# Patient Record
Sex: FEMALE | Race: WHITE
[De-identification: names, ages, dates, MRNs, and addresses within clinical notes are randomized per-mention and may not be internally consistent; named-entity substitution may affect disease eponyms.]

---

## 2017-12-13 ENCOUNTER — HOSPITAL ENCOUNTER (OUTPATIENT)
Dept: HOSPITAL 62 - WI | Age: 57
End: 2017-12-13
Attending: OBSTETRICS & GYNECOLOGY
Payer: COMMERCIAL

## 2017-12-13 DIAGNOSIS — Z12.31: Primary | ICD-10-CM

## 2017-12-13 PROCEDURE — G0202 SCR MAMMO BI INCL CAD: HCPCS

## 2017-12-13 PROCEDURE — 77067 SCR MAMMO BI INCL CAD: CPT

## 2017-12-13 NOTE — WOMENS IMAGING REPORT
EXAM DESCRIPTION:  BILAT SCREENING MAMMO W/CAD



COMPLETED DATE/TIME:  12/13/2017 7:42 am



REASON FOR STUDY:  SCREENING MAMMO Z12.31  ENCNTR SCREEN MAMMOGRAM FOR MALIGNANT NEOPLASM OF [place]



COMPARISON:  2009 to 2016



TECHNIQUE:  Standard craniocaudal and mediolateral oblique views of each breast recorded using digita
l acquisition.



LIMITATIONS:  None.



FINDINGS:  No masses, calcifications or architectural distortion. No areas of suspicion.

Read with the assistance of CAD.

.[facility] - R2 Cenova Version 1.3

.[facility] - R2 Cenova Version 1.3

.\[facility]\"" [facility] - R2 Cenova Version 2.4

.[facility] - R2 Cenova Version 2.4

.[facility] - R2  Version 9.2



IMPRESSION:  NORMAL MAMMOGRAM.  BIRADS 1.



BREAST DENSITY:  b. There are scattered areas of fibroglandular density.



BIRAD:  1 NEGATIVE



RECOMMENDATION:  ROUTINE SCREENING



COMMENT:  The patient has been notified of the results by letter per MQSA requirements. Additional no
tification policies are in place for contacting patient with suspicious or incomplete findings.

Quality ID #225: The American College of Radiology recommends an annual screening mammogram for women
 aged 40 years or over. This facility utilizes a reminder system to ensure that all patients receive 
reminder letters, and/or direct phone calls for appointments. This includes reminders for routine scr
eening mammograms, diagnostic mammograms, or other Breast Imaging Interventions when appropriate.  Th
is patient will be placed in the appropriate reminder system.

The American College of Radiology (ACR) has developed recommendations for screening MRI of the breast
s in certain patient populations, to be used in conjunction with mammography.  Breast MRI surveillanc
e may be appropriate for women with more than 20% lifetime risk of developing breast cancer  as deter
mined by genetic testing, significant family history of the disease, or history of mantle radiation f
or Hodgkins Disease.  ACR Practice Guidelines 2008.



TECHNICAL DOCUMENTATION:  FINDING NUMBER: (1)

ASSESSMENT: (1)

JOB ID:  [id number]

 2011 [employer]- All Rights Reserved

## 2018-01-23 ENCOUNTER — HOSPITAL ENCOUNTER (OUTPATIENT)
Dept: HOSPITAL 62 - WI | Age: 58
End: 2018-01-23
Attending: INTERNAL MEDICINE
Payer: COMMERCIAL

## 2018-01-23 DIAGNOSIS — R10.13: Primary | ICD-10-CM

## 2018-01-23 PROCEDURE — 76705 ECHO EXAM OF ABDOMEN: CPT

## 2018-01-23 NOTE — WOMENS IMAGING REPORT
EXAM DESCRIPTION:  U/S ABDOMEN LIMITED



COMPLETED DATE/TIME:  1/23/2018 8:34 am



REASON FOR STUDY:  EPIGASTRIC PAIN R10.13  EPIGASTRIC PAIN



COMPARISON:  None.



TECHNIQUE:  Dynamic and static grayscale images acquired of the abdomen and recorded on PACS. Additio
nal selected color Doppler and spectral images recorded.



LIMITATIONS:  None.



FINDINGS:  PANCREAS: Midline pancreas unremarkable

LIVER: No masses. Echotexture normal.

LIVER VASCULATURE: Normal directional flow of the main portal vein and hepatic veins.

GALLBLADDER: Surgically absent

ULTRASOUND-DETECTED CURRY'S SIGN: Negative.

INTRAHEPATIC DUCTS AND COMMON DUCT: CBD and intrahepatic ducts normal caliber. No filling defects.  D
istal most common duct not well seen due to duodenum gas

INFERIOR VENA CAVA: Normal flow.

AORTA: Not well seen

RIGHT KIDNEY:  Normal size. Normal echogenicity. No solid or suspicious masses. No hydronephrosis. No
 calcifications.

PERITONEAL AND RIGHT PLEURAL SPACE: No ascites or effusions.

OTHER: No other significant findings.



IMPRESSION:  Post cholecystectomy.  Otherwise unremarkable study



TECHNICAL DOCUMENTATION:  JOB ID:  [id number]

 2011 [employer]- All Rights Reserved

## 2018-10-19 ENCOUNTER — HOSPITAL ENCOUNTER (OUTPATIENT)
Dept: HOSPITAL 62 - OD | Age: 58
End: 2018-10-19
Attending: INTERNAL MEDICINE
Payer: COMMERCIAL

## 2018-10-19 DIAGNOSIS — Z00.00: Primary | ICD-10-CM

## 2018-10-19 DIAGNOSIS — I10: ICD-10-CM

## 2018-10-19 DIAGNOSIS — E78.5: ICD-10-CM

## 2018-10-19 DIAGNOSIS — E55.9: ICD-10-CM

## 2018-10-19 DIAGNOSIS — R53.83: ICD-10-CM

## 2018-10-19 LAB
ADD MANUAL DIFF: NO
ALBUMIN SERPL-MCNC: 3.9 G/DL (ref 3.5–5)
ALP SERPL-CCNC: 103 U/L (ref 38–126)
ALT SERPL-CCNC: 30 U/L (ref 9–52)
ANION GAP SERPL CALC-SCNC: 8 MMOL/L (ref 5–19)
AST SERPL-CCNC: 23 U/L (ref 14–36)
BASOPHILS # BLD AUTO: 0 10^3/UL (ref 0–0.2)
BASOPHILS NFR BLD AUTO: 0.4 % (ref 0–2)
BILIRUB DIRECT SERPL-MCNC: 0.2 MG/DL (ref 0–0.4)
BILIRUB SERPL-MCNC: 0.6 MG/DL (ref 0.2–1.3)
BUN SERPL-MCNC: 18 MG/DL (ref 7–20)
CALCIUM: 9.5 MG/DL (ref 8.4–10.2)
CHLORIDE SERPL-SCNC: 105 MMOL/L (ref 98–107)
CHOLEST SERPL-MCNC: 166 MG/DL (ref 0–200)
CO2 SERPL-SCNC: 30 MMOL/L (ref 22–30)
EOSINOPHIL # BLD AUTO: 0.1 10^3/UL (ref 0–0.6)
EOSINOPHIL NFR BLD AUTO: 2.4 % (ref 0–6)
ERYTHROCYTE [DISTWIDTH] IN BLOOD BY AUTOMATED COUNT: 13.4 % (ref 11.5–14)
GLUCOSE SERPL-MCNC: 88 MG/DL (ref 75–110)
HCT VFR BLD CALC: 40.6 % (ref 36–47)
HGB BLD-MCNC: 13.6 G/DL (ref 12–15.5)
LDLC SERPL DIRECT ASSAY-MCNC: 77 MG/DL (ref ?–100)
LYMPHOCYTES # BLD AUTO: 1.5 10^3/UL (ref 0.5–4.7)
LYMPHOCYTES NFR BLD AUTO: 28.2 % (ref 13–45)
MCH RBC QN AUTO: 30.5 PG (ref 27–33.4)
MCHC RBC AUTO-ENTMCNC: 33.6 G/DL (ref 32–36)
MCV RBC AUTO: 91 FL (ref 80–97)
MONOCYTES # BLD AUTO: 0.3 10^3/UL (ref 0.1–1.4)
MONOCYTES NFR BLD AUTO: 5.7 % (ref 3–13)
NEUTROPHILS # BLD AUTO: 3.4 10^3/UL (ref 1.7–8.2)
NEUTS SEG NFR BLD AUTO: 63.3 % (ref 42–78)
PLATELET # BLD: 201 10^3/UL (ref 150–450)
POTASSIUM SERPL-SCNC: 4.7 MMOL/L (ref 3.6–5)
PROT SERPL-MCNC: 7 G/DL (ref 6.3–8.2)
RBC # BLD AUTO: 4.48 10^6/UL (ref 3.72–5.28)
SODIUM SERPL-SCNC: 142.6 MMOL/L (ref 137–145)
TOTAL CELLS COUNTED % (AUTO): 100 %
TRIGL SERPL-MCNC: 70 MG/DL (ref ?–150)
VLDLC SERPL CALC-MCNC: 14 MG/DL (ref 10–31)
WBC # BLD AUTO: 5.4 10^3/UL (ref 4–10.5)

## 2018-10-19 PROCEDURE — 84443 ASSAY THYROID STIM HORMONE: CPT

## 2018-10-19 PROCEDURE — 82306 VITAMIN D 25 HYDROXY: CPT

## 2018-10-19 PROCEDURE — 85025 COMPLETE CBC W/AUTO DIFF WBC: CPT

## 2018-10-19 PROCEDURE — 80061 LIPID PANEL: CPT

## 2018-10-19 PROCEDURE — 36415 COLL VENOUS BLD VENIPUNCTURE: CPT

## 2018-10-19 PROCEDURE — 80053 COMPREHEN METABOLIC PANEL: CPT

## 2018-12-14 ENCOUNTER — HOSPITAL ENCOUNTER (OUTPATIENT)
Dept: HOSPITAL 62 - WI | Age: 58
End: 2018-12-14
Attending: OBSTETRICS & GYNECOLOGY
Payer: COMMERCIAL

## 2018-12-14 DIAGNOSIS — Z12.31: Primary | ICD-10-CM

## 2018-12-14 PROCEDURE — 77063 BREAST TOMOSYNTHESIS BI: CPT

## 2018-12-14 PROCEDURE — 77067 SCR MAMMO BI INCL CAD: CPT

## 2018-12-14 NOTE — WOMENS IMAGING REPORT
EXAM DESCRIPTION:  3D SCREENING MAMMO BILAT



COMPLETED DATE/TIME:  12/14/2018 10:17 am



REASON FOR STUDY:  Z12.31 SCREENING MAMMO Z12.31  ENCNTR SCREEN MAMMOGRAM FOR MALIGNANT NEOPLASM OF B
RE



COMPARISON:  Multiple since 2009



TECHNIQUE:  Standard craniocaudal and mediolateral oblique views of each breast recorded using digita
l acquisition and breast tomosynthesis.



LIMITATIONS:  None.



FINDINGS:  No masses, calcifications or architectural distortion. No areas of suspicion.

Read with the assistance of CAD.

.[facility] - R2 Cenova Version 1.3

.[facility] - R2 Cenova Version 1.3

.[facility] - R2 Cenova Version 2.4

.[facility] - R2 Cenova Version 2.4

.[facility] - R2  Version 9.2



IMPRESSION:  NORMAL MAMMOGRAM.  BIRADS 1.



BREAST DENSITY:  c. The breasts are heterogeneously dense, which may obscure small masses.



BIRAD:  1 NEGATIVE



RECOMMENDATION:  ROUTINE SCREENING

Please continue yearly bilateral screening mammography/tomosynthesis in December 2019



COMMENT:  The patient has been notified of the results by letter per SA requirements. Additional no
tification policies are in place for contacting patient with suspicious or incomplete findings.

Quality ID #225: The American College of Radiology recommends an annual screening mammogram for women
 aged 40 years or over. This facility utilizes a reminder system to ensure that all patients receive 
reminder letters, and/or direct phone calls for appointments. This includes reminders for routine scr
eening mammograms, diagnostic mammograms, or other Breast Imaging Interventions when appropriate.  Th
is patient will be placed in the appropriate reminder system.

The American College of Radiology (ACR) has developed recommendations for screening MRI of the breast
s in certain patient populations, to be used in conjunction with mammography.  Breast MRI surveillanc
e may be appropriate for women with more than 20% lifetime risk of developing breast cancer  as deter
mined by genetic testing, significant family history of the disease, or history of mantle radiation f
or Hodgkins Disease.  ACR Practice Guidelines 2008.

[employer]



PQRS 6045F: Fluoroscopic imaging is not utilized for breast tomosynthesis.



TECHNICAL DOCUMENTATION:  FINDING NUMBER: (1)

ASSESSMENT:  (1)

JOB ID:  [id number]

 2011 [employer]- All Rights Reserved



Reading location - IP/workstation name: [facility]

## 2019-09-16 ENCOUNTER — HOSPITAL ENCOUNTER (EMERGENCY)
Dept: HOSPITAL 62 - ER | Age: 59
Discharge: HOME | End: 2019-09-16
Payer: COMMERCIAL

## 2019-09-16 VITALS — SYSTOLIC BLOOD PRESSURE: 150 MMHG | DIASTOLIC BLOOD PRESSURE: 81 MMHG

## 2019-09-16 DIAGNOSIS — R11.0: ICD-10-CM

## 2019-09-16 DIAGNOSIS — N30.91: Primary | ICD-10-CM

## 2019-09-16 DIAGNOSIS — I10: ICD-10-CM

## 2019-09-16 DIAGNOSIS — Z98.1: ICD-10-CM

## 2019-09-16 DIAGNOSIS — R35.0: ICD-10-CM

## 2019-09-16 DIAGNOSIS — Z88.2: ICD-10-CM

## 2019-09-16 DIAGNOSIS — Z90.49: ICD-10-CM

## 2019-09-16 LAB
ADD MANUAL DIFF: NO
ALBUMIN SERPL-MCNC: 4.5 G/DL (ref 3.5–5)
ALP SERPL-CCNC: 129 U/L (ref 38–126)
ANION GAP SERPL CALC-SCNC: 9 MMOL/L (ref 5–19)
APPEARANCE UR: (no result)
APTT PPP: (no result) S
AST SERPL-CCNC: 27 U/L (ref 14–36)
BASOPHILS # BLD AUTO: 0 10^3/UL (ref 0–0.2)
BASOPHILS NFR BLD AUTO: 0.4 % (ref 0–2)
BILIRUB DIRECT SERPL-MCNC: 0 MG/DL (ref 0–0.4)
BILIRUB SERPL-MCNC: 0.5 MG/DL (ref 0.2–1.3)
BILIRUB UR QL STRIP: NEGATIVE
BUN SERPL-MCNC: 13 MG/DL (ref 7–20)
CALCIUM: 10 MG/DL (ref 8.4–10.2)
CHLORIDE SERPL-SCNC: 99 MMOL/L (ref 98–107)
CO2 SERPL-SCNC: 30 MMOL/L (ref 22–30)
EOSINOPHIL # BLD AUTO: 0 10^3/UL (ref 0–0.6)
EOSINOPHIL NFR BLD AUTO: 0.2 % (ref 0–6)
ERYTHROCYTE [DISTWIDTH] IN BLOOD BY AUTOMATED COUNT: 13.2 % (ref 11.5–14)
GLUCOSE SERPL-MCNC: 108 MG/DL (ref 75–110)
GLUCOSE UR STRIP-MCNC: NEGATIVE MG/DL
HCT VFR BLD CALC: 40.9 % (ref 36–47)
HGB BLD-MCNC: 13.8 G/DL (ref 12–15.5)
KETONES UR STRIP-MCNC: NEGATIVE MG/DL
LYMPHOCYTES # BLD AUTO: 1 10^3/UL (ref 0.5–4.7)
LYMPHOCYTES NFR BLD AUTO: 8.9 % (ref 13–45)
MCH RBC QN AUTO: 29.8 PG (ref 27–33.4)
MCHC RBC AUTO-ENTMCNC: 33.8 G/DL (ref 32–36)
MCV RBC AUTO: 88 FL (ref 80–97)
MONOCYTES # BLD AUTO: 0.4 10^3/UL (ref 0.1–1.4)
MONOCYTES NFR BLD AUTO: 3.6 % (ref 3–13)
NEUTROPHILS # BLD AUTO: 10.1 10^3/UL (ref 1.7–8.2)
NEUTS SEG NFR BLD AUTO: 86.9 % (ref 42–78)
NITRITE UR QL STRIP: POSITIVE
PH UR STRIP: 7 [PH] (ref 5–9)
PLATELET # BLD: 182 10^3/UL (ref 150–450)
POTASSIUM SERPL-SCNC: 4 MMOL/L (ref 3.6–5)
PROT SERPL-MCNC: 7.6 G/DL (ref 6.3–8.2)
PROT UR STRIP-MCNC: 100 MG/DL
RBC # BLD AUTO: 4.64 10^6/UL (ref 3.72–5.28)
SP GR UR STRIP: 1.01
TOTAL CELLS COUNTED % (AUTO): 100 %
UROBILINOGEN UR-MCNC: 8 MG/DL (ref ?–2)
WBC # BLD AUTO: 11.6 10^3/UL (ref 4–10.5)

## 2019-09-16 PROCEDURE — S0119 ONDANSETRON 4 MG: HCPCS

## 2019-09-16 PROCEDURE — 81001 URINALYSIS AUTO W/SCOPE: CPT

## 2019-09-16 PROCEDURE — 85025 COMPLETE CBC W/AUTO DIFF WBC: CPT

## 2019-09-16 PROCEDURE — 80053 COMPREHEN METABOLIC PANEL: CPT

## 2019-09-16 PROCEDURE — 36415 COLL VENOUS BLD VENIPUNCTURE: CPT

## 2019-09-16 PROCEDURE — 99283 EMERGENCY DEPT VISIT LOW MDM: CPT

## 2019-09-16 NOTE — ER DOCUMENT REPORT
ED Medical Screen (RME)





- General


Chief Complaint: Urinary Problem


Stated Complaint: NAUSEA/BLOOD IN URINE


Time Seen by Provider: 09/16/19 19:51


Primary Care Provider: 


EVANS BANKS MD [Primary Care Provider] - Follow up as needed


Mode of Arrival: Ambulatory


Information source: Patient


Notes: 





Patient is a 58-year-old female presenting to the emergency department chief 

complaint of hematuria, dysuria and generalized malaise.  Patient reports 3 days

ago she had a syncopal episode at home.  She denies getting any treatment for 

that.  She currently does not denies any fever, chest pain, shortness of breath,

abdominal pain or back pain.





Exam:


Abdomen soft, nontender without guarding or rebound.


Lung sounds clear and equal bilaterally.


Patient alert, oriented, answering all questions appropriately.





I have greeted and performed a rapid initial assessment of this patient.  A 

comprehensive ED assessment and evaluation of the patient, analysis of test 

results and completion of the medical decision making process will be conducted 

by additional ED providers.  I have specifically instructed the patient or 

family members with the patient to immediately return to any nursing staff 

should anything change in the patient's condition or with their chief complaint.





This medical record was dictated with voice recognizing software.  There may be 

grammatical, syntax errors that are unintended.





TRAVEL OUTSIDE OF THE [place] IN LAST 30 DAYS: No





- Related Data


Allergies/Adverse Reactions: 


                                        





Sulfa (Sulfonamide Antibiotics) Allergy (Verified 09/16/19 18:57)


   


diazide Adverse Reaction (Intermediate, Uncoded 09/16/19 19:49)


   











Past Medical History





- Past Medical History


Cardiac Medical History: Reports: Hx Hypertension


   Denies: Hx Atrial Fibrillation, Hx Congestive Heart Failure, Hx Coronary 

Artery Disease, Hx Heart Attack, Hx Hypercholesterolemia, Hx Peripheral Vascular

Disease, Hx Heart Murmur


Pulmonary Medical History: Reports: Hx Pneumonia


   Denies: Hx Tuberculosis


GI Medical History: Reports: Hx Gastroesophageal Reflux Disease


Musculoskeltal Medical History: Reports Hx Arthritis, Reports Hx Musculoskeletal

Trauma


Traumatic Medical History: Reports: Hx Fractures


Past Surgical History: Reports: Hx Bowel Surgery - as infant for bleeding in the

bowels, Hx Cholecystectomy, Hx Orthopedic Surgery - cervical fusion x 2, 

bilateral knees torn meniscus.  Denies: Hx Pacemaker





- Immunizations


Hx Diphtheria, Pertussis, Tetanus Vaccination: Yes - 11/16





Physical Exam





- Vital signs


Vitals: 





                                        











Temp Pulse Resp BP Pulse Ox


 


 99.0 F   85   14   160/96 H  98 


 


 09/16/19 19:25  09/16/19 19:25  09/16/19 19:25  09/16/19 19:25  09/16/19 19:25














Course





- Vital Signs


Vital signs: 





                                        











Temp Pulse Resp BP Pulse Ox


 


 99.0 F   85   14   160/96 H  98 


 


 09/16/19 19:25  09/16/19 19:25  09/16/19 19:25  09/16/19 19:25  09/16/19 19:25














Doctor's Discharge





- Discharge


Referrals: 


EVANS BANKS MD [Primary Care Provider] - Follow up as needed

## 2019-09-16 NOTE — ER DOCUMENT REPORT
ED General





- General


Chief Complaint: Urinary Problem


Stated Complaint: NAUSEA/BLOOD IN URINE


Time Seen by Provider: 09/16/19 19:51


Primary Care Provider: 


EVANS BANKS MD [ACTIVE STAFF] - Follow up as needed


Mode of Arrival: Ambulatory


Information source: Patient


TRAVEL OUTSIDE OF THE [place] IN LAST 30 DAYS: No





- HPI


Notes: 





Patient complains of nausea and frequency and dysuria.  She also states she has 

had some blood in her urine.  She states this is been going on for 1 day.  It is

intermittent.  The pain is worse with urination and better without urination.  

The pain radiates across her lower abdomen.  It is crampy and burning.  She is 

also had weakness and malaise.  The pain is been moderate in intensity.  No 

rashes.  No fevers.





- Related Data


Allergies/Adverse Reactions: 


                                        





Sulfa (Sulfonamide Antibiotics) Allergy (Verified 09/16/19 18:57)


   


diazide Adverse Reaction (Intermediate, Uncoded 09/16/19 19:49)


   











Past Medical History





- General


Information source: Patient





- Social History


Smoking Status: Never Smoker


Frequency of alcohol use: None


Drug Abuse: None


Family History: Arthritis, Hypertension, Malignancy


Patient has suicidal ideation: No


Patient has homicidal ideation: No





- Past Medical History


Cardiac Medical History: Reports: Hx Hypertension


   Denies: Hx Atrial Fibrillation, Hx Congestive Heart Failure, Hx Coronary 

Artery Disease, Hx Heart Attack, Hx Hypercholesterolemia, Hx Peripheral Vascular

Disease, Hx Heart Murmur


Pulmonary Medical History: Reports: Hx Pneumonia


   Denies: Hx Tuberculosis


GI Medical History: Reports: Hx Gastroesophageal Reflux Disease


Musculoskeletal Medical History: Reports Hx Arthritis, Reports Hx 

Musculoskeletal Trauma


Traumatic Medical History: Reports: Hx Fractures


Past Surgical History: Reports: Hx Bowel Surgery - as infant for bleeding in the

bowels, Hx Cholecystectomy, Hx Orthopedic Surgery - cervical fusion x 2, 

bilateral knees torn meniscus.  Denies: Hx Pacemaker





- Immunizations


Hx Diphtheria, Pertussis, Tetanus Vaccination: Yes - 11/16





Review of Systems





- Review of Systems


Constitutional: Chills, Malaise, Weakness


Cardiovascular: denies: Chest pain, Dyspnea


Respiratory: denies: Cough, Short of breath


Gastrointestinal: Nausea.  denies: Diarrhea, Vomiting


-: Yes All other systems reviewed and negative





Physical Exam





- Vital signs


Vitals: 





                                        











Temp Pulse Resp BP Pulse Ox


 


 99.0 F   85   14   160/96 H  98 


 


 09/16/19 19:25  09/16/19 19:25  09/16/19 19:25  09/16/19 19:25  09/16/19 19:25











Interpretation: Hypertensive





- General


General appearance: Appears well, Alert





- HEENT


Head: Normocephalic, Atraumatic


Eyes: Normal


Pupils: PERRL





- Respiratory


Respiratory status: No respiratory distress


Chest status: Nontender


Breath sounds: Normal


Chest palpation: Normal





- Cardiovascular


Rhythm: Regular


Heart sounds: Normal auscultation


Murmur: No





- Abdominal


Inspection: Normal


Distension: No distension


Bowel sounds: Normal


Tenderness: Tender - Mild lower abdominal tenderness to palpation


Organomegaly: No organomegaly





- Back


Back: Normal, Nontender





- Extremities


General upper extremity: Normal inspection, Nontender, Normal color, Normal ROM,

Normal temperature


General lower extremity: Normal inspection, Nontender, Normal color, Normal ROM,

Normal temperature, Normal weight bearing.  No: Shea's sign





- Neurological


Neuro grossly intact: Yes


Cognition: Normal


Orientation: AAOx4


Kia Coma Scale Eye Opening: Spontaneous


Kulm Coma Scale Verbal: Oriented


Kulm Coma Scale Motor: Obeys Commands


Kia Coma Scale Total: 15


Speech: Normal


Motor strength normal: LUE, RUE, LLE, RLE


Sensory: Normal





- Psychological


Associated symptoms: Normal affect, Normal mood





- Skin


Skin Temperature: Warm


Skin Moisture: Dry


Skin Color: Normal





Course





- Re-evaluation


Re-evalutation: 





09/16/19 21:40


Patient is clinical presentation is consistent with pyelonephritis.  She will be

started on oral antibiotics as patient has stable vital signs here.  She does 

not appear toxic.  I do not see evidence the patient is going to require 

intravenous fluids or antibiotics.





- Vital Signs


Vital signs: 





                                        











Temp Pulse Resp BP Pulse Ox


 


 99.0 F   85   14   160/96 H  98 


 


 09/16/19 19:25  09/16/19 19:25  09/16/19 19:25  09/16/19 19:25  09/16/19 19:25














- Laboratory


Result Diagrams: 


                                 09/16/19 20:40





                                 09/16/19 20:40


Laboratory results interpreted by me: 





                                        











  09/16/19 09/16/19 09/16/19





  20:40 20:40 20:40


 


WBC  11.6 H  


 


Lymph % (Auto)  8.9 L  


 


Absolute Neuts (auto)  10.1 H  


 


Seg Neutrophils %  86.9 H  


 


Alkaline Phosphatase   129 H 


 


Urine Protein    100 H


 


Urine Blood    LARGE H


 


Urine Nitrite    POSITIVE H


 


Urine Urobilinogen    8.0 H


 


Ur Leukocyte Esterase    LARGE H














Discharge





- Discharge


Clinical Impression: 


 Hemorrhagic cystitis





Condition: Stable


Disposition: HOME, SELF-CARE


Instructions:  Urinary Tract Infection ([facility])


Additional Instructions: 


Please call your primary care physician first thing in the morning to arrange 

follow-up.


Prescriptions: 


Cefdinir 300 mg PO BID 10 Days #20 capsule


Ondansetron [Zofran Odt 4 mg Tablet] 1 - 2 tab PO Q4H PRN #15 tab.rapdis


 PRN Reason: For Nausea/Vomiting


Referrals: 


EVANS BANKS MD [ACTIVE STAFF] - Follow up in 3-5 days

## 2019-10-15 ENCOUNTER — HOSPITAL ENCOUNTER (OUTPATIENT)
Dept: HOSPITAL 62 - SP | Age: 59
End: 2019-10-15
Attending: FAMILY MEDICINE
Payer: COMMERCIAL

## 2019-10-15 DIAGNOSIS — R01.1: Primary | ICD-10-CM

## 2019-10-15 PROCEDURE — 93306 TTE W/DOPPLER COMPLETE: CPT

## 2019-10-15 NOTE — XCELERA REPORT
[address] [address]

                               Tel: [phone]

                               Fax: [phone]



                      Transthoracic Echocardiogram Report

_______________________________________________________________________________



Name: ESTEFANI SARMIENTO

MRN: [id number]                                Age: 59 yrs

Gender: Female                                 : 1960

Patient Status: Preadmit                       Patient Location: 

Account #: [id number]

Study Date: 10/15/2019 09:57 AM

Accession #: [id number]

_______________________________________________________________________________



Height: 64 in        Weight: 185 lb        BSA: 1.9 m2

_______________________________________________________________________________

Procedure: A complete two-dimensional transthoracic echocardiogram was

performed (2D, M-mode, spectral and color flow Doppler). The study was

technically adequate with some images being suboptimal in quality.

Reason For Study: CARDIAC MURMUR





Ordering Physician: MELVIN JUSTICE

Performed By: Rebeca Ortiz



_______________________________________________________________________________



Interpretation Summary

The left ventricular ejection fraction is normal.

Doppler measurements suggest pseudonormalized left ventricular relaxation,

which is associated with grade II/IV or mild to moderate diastolic dysfunction

There is borderline concentric left ventricular hypertrophy.

The left ventricle is grossly normal size.

Wall motion cannot be accurately commented on, but no definite regional wall

motion abnormalities noted.

The right ventricular systolic function is normal.

The left atrium is mildly dilated.

The right atrium is normal in size

There is a trace amount of mitral regurgitation

There is no mitral valve stenosis.

No aortic regurgitation is present.

There is no aortic valve stenosis

There is a trace to mild amount of tricuspid regurgitation

Right ventricular systolic pressure is at the upper limits of normal

The aortic root is not well visualized but is probably normal size.

The inferior vena cava was not well visualized

There is no pericardial effusion.



MMode/2D Measurements & Calculations

RVDd: 3.2 cm   LVIDd: 4.8 cm   FS: 42.2 %             Ao root diam: 2.9 cm

IVSd: 0.74 cm  LVIDs: 2.8 cm   EDV(Teich): 108.8 ml

                                                      Ao root area: 6.7 cm2

               LVPWd: 0.95 cm  ESV(Teich): 29.3 ml

                               EF(Teich): 73.1 %



Doppler Measurements & Calculations

MV E max bakari:       MV dec slope:         Ao V2 max:         LV V1 max P.8 cm/sec         492.8 cm/sec2         127.9 cm/sec       3.7 mmHg

MV A max bakari:       MV dec time: 0.12 sec Ao max P.5 mmHgLV V1 max:

108.1 cm/sec                                                 95.7 cm/sec

MV E/A: 0.55

        _______________________________________________________________

PA V2 max:          PI end-d bakari:         TR max bakari:

109.8 cm/sec        132.7 cm/sec          252.0 cm/sec

PA max P.8 mmHg                       TR max P.4 mmHg





Left Ventricle

The left ventricle is grossly normal size. There is borderline concentric left

ventricular hypertrophy. The left ventricular ejection fraction is normal.

Doppler measurements suggest pseudonormalized left ventricular relaxation,

which is associated with grade II/IV or mild to moderate diastolic

dysfunction. Wall motion cannot be accurately commented on, but no definite

regional wall motion abnormalities noted.



Right Ventricle

The right ventricle is grossly normal size. There is normal right ventricular

wall thickness. The right ventricular systolic function is normal.



Atria

The right atrium is normal in size. The left atrium is mildly dilated.

Interarterial septum not well visualized and not well dopplered. Cannot

comment on ASD/PFO presence.



Mitral Valve

The mitral valve is grossly normal. There is no mitral valve stenosis. There

is a trace amount of mitral regurgitation.





Aortic Valve

The aortic valve is grossly normal. There is no aortic valve stenosis. No

aortic regurgitation is present.



Tricuspid Valve

The tricuspid valve is not well visualized, but is grossly normal. There is no

tricuspid stenosis. There is a trace to mild amount of tricuspid

regurgitation. Right ventricular systolic pressure is at the upper limits of

normal.



Pulmonic Valve

The pulmonic valve is not well visualized.



Great Vessels

The aortic root is not well visualized but is probably normal size. The

inferior vena cava was not well visualized.





Effusions

There is no pericardial effusion.



_______________________________________________________________________________

_______________________________________________________________________________



Electronically signed by:      Yolis Dee      on 10/15/2019 06:50 PM



CC: MELVIN JUSTICE Shyamal

## 2020-09-16 ENCOUNTER — HOSPITAL ENCOUNTER (EMERGENCY)
Dept: HOSPITAL 62 - ER | Age: 60
LOS: 1 days | Discharge: HOME | End: 2020-09-17
Payer: COMMERCIAL

## 2020-09-16 DIAGNOSIS — I10: ICD-10-CM

## 2020-09-16 DIAGNOSIS — M79.89: ICD-10-CM

## 2020-09-16 DIAGNOSIS — Z88.2: ICD-10-CM

## 2020-09-16 DIAGNOSIS — R11.0: ICD-10-CM

## 2020-09-16 DIAGNOSIS — Z79.899: ICD-10-CM

## 2020-09-16 DIAGNOSIS — R00.2: Primary | ICD-10-CM

## 2020-09-16 DIAGNOSIS — E87.6: ICD-10-CM

## 2020-09-16 LAB
ADD MANUAL DIFF: NO
ALBUMIN SERPL-MCNC: 4.2 G/DL (ref 3.5–5)
ALP SERPL-CCNC: 125 U/L (ref 38–126)
ANION GAP SERPL CALC-SCNC: 8 MMOL/L (ref 5–19)
APTT BLD: 26.4 SEC (ref 23.5–35.8)
AST SERPL-CCNC: 22 U/L (ref 14–36)
BASOPHILS # BLD AUTO: 0 10^3/UL (ref 0–0.2)
BASOPHILS NFR BLD AUTO: 0.2 % (ref 0–2)
BILIRUB DIRECT SERPL-MCNC: 0.2 MG/DL (ref 0–0.4)
BILIRUB SERPL-MCNC: 0.6 MG/DL (ref 0.2–1.3)
BUN SERPL-MCNC: 12 MG/DL (ref 7–20)
CALCIUM: 9.5 MG/DL (ref 8.4–10.2)
CHLORIDE SERPL-SCNC: 97 MMOL/L (ref 98–107)
CK SERPL-CCNC: 46 U/L (ref 30–135)
CO2 SERPL-SCNC: 32 MMOL/L (ref 22–30)
EOSINOPHIL # BLD AUTO: 0.1 10^3/UL (ref 0–0.6)
EOSINOPHIL NFR BLD AUTO: 0.6 % (ref 0–6)
ERYTHROCYTE [DISTWIDTH] IN BLOOD BY AUTOMATED COUNT: 12.9 % (ref 11.5–14)
GLUCOSE SERPL-MCNC: 113 MG/DL (ref 75–110)
HCT VFR BLD CALC: 41.3 % (ref 36–47)
HGB BLD-MCNC: 14 G/DL (ref 12–15.5)
INR PPP: 0.95
LYMPHOCYTES # BLD AUTO: 1.5 10^3/UL (ref 0.5–4.7)
LYMPHOCYTES NFR BLD AUTO: 15.6 % (ref 13–45)
MCH RBC QN AUTO: 30.9 PG (ref 27–33.4)
MCHC RBC AUTO-ENTMCNC: 34 G/DL (ref 32–36)
MCV RBC AUTO: 91 FL (ref 80–97)
MONOCYTES # BLD AUTO: 0.5 10^3/UL (ref 0.1–1.4)
MONOCYTES NFR BLD AUTO: 5.7 % (ref 3–13)
NEUTROPHILS # BLD AUTO: 7.5 10^3/UL (ref 1.7–8.2)
NEUTS SEG NFR BLD AUTO: 77.9 % (ref 42–78)
PLATELET # BLD: 180 10^3/UL (ref 150–450)
POTASSIUM SERPL-SCNC: 3.4 MMOL/L (ref 3.6–5)
PROT SERPL-MCNC: 7.5 G/DL (ref 6.3–8.2)
PROTHROMBIN TIME: 12.9 SEC (ref 11.4–15.4)
RBC # BLD AUTO: 4.55 10^6/UL (ref 3.72–5.28)
TOTAL CELLS COUNTED % (AUTO): 100 %
WBC # BLD AUTO: 9.6 10^3/UL (ref 4–10.5)

## 2020-09-16 PROCEDURE — 85610 PROTHROMBIN TIME: CPT

## 2020-09-16 PROCEDURE — 84443 ASSAY THYROID STIM HORMONE: CPT

## 2020-09-16 PROCEDURE — 99285 EMERGENCY DEPT VISIT HI MDM: CPT

## 2020-09-16 PROCEDURE — 83735 ASSAY OF MAGNESIUM: CPT

## 2020-09-16 PROCEDURE — 36415 COLL VENOUS BLD VENIPUNCTURE: CPT

## 2020-09-16 PROCEDURE — 85730 THROMBOPLASTIN TIME PARTIAL: CPT

## 2020-09-16 PROCEDURE — 85025 COMPLETE CBC W/AUTO DIFF WBC: CPT

## 2020-09-16 PROCEDURE — 82550 ASSAY OF CK (CPK): CPT

## 2020-09-16 PROCEDURE — 93010 ELECTROCARDIOGRAM REPORT: CPT

## 2020-09-16 PROCEDURE — 71046 X-RAY EXAM CHEST 2 VIEWS: CPT

## 2020-09-16 PROCEDURE — 84484 ASSAY OF TROPONIN QUANT: CPT

## 2020-09-16 PROCEDURE — 93005 ELECTROCARDIOGRAM TRACING: CPT

## 2020-09-16 PROCEDURE — 80053 COMPREHEN METABOLIC PANEL: CPT

## 2020-09-16 NOTE — ER DOCUMENT REPORT
ED Medical Screen (RME)





- General


Chief Complaint: Irregular Pulse


Stated Complaint: IRREGULAR HEART BEAT AND NAUSEA


Time Seen by Provider: 09/16/20 20:25


Primary Care Provider: 


EVANS BANKS MD [Primary Care Provider] - Follow up as needed


Mode of Arrival: Ambulatory


Information source: Patient


Notes: 





59-year-old female presented to ED for complaint of feeling like she has 

palpitation nausea and abdominal cramping.  She states she has not had any fever

or any other symptoms.  She does have a history of diastolic dysfunction and 

heart murmurs.  She also has a history of high blood pressure reflux.  She 

states she is not having chest pain but she does feel like her heart is 

pounding.  She states it does make her concerned so she came to get this checked

out.  Patient is alert oriented respirations regular nonlabored speaking in full

sentences at this time. 














I have greeted and performed a rapid initial assessment of this patient.  A 

comprehensive ED assessment and evaluation of the patient, analysis of test 

results and completion of medical decision making process will be conducted by 

an additional ED providers.


TRAVEL OUTSIDE OF THE [place] IN LAST 30 DAYS: No





- Related Data


Allergies/Adverse Reactions: 


                                        





Sulfa (Sulfonamide Antibiotics) Allergy (Verified 09/16/20 20:26)


   


diazide Adverse Reaction (Intermediate, Uncoded 09/16/19 19:49)


   











Past Medical History





- Past Medical History


Cardiac Medical History: Reports: Hx Hypertension


   Denies: Hx Atrial Fibrillation, Hx Congestive Heart Failure, Hx Coronary 

Artery Disease, Hx Heart Attack, Hx Hypercholesterolemia, Hx Peripheral Vascular

Disease, Hx Heart Murmur


Pulmonary Medical History: Reports: Hx Pneumonia


   Denies: Hx Tuberculosis


GI Medical History: Reports: Hx Gastroesophageal Reflux Disease


Musculoskeltal Medical History: Reports Hx Arthritis, Reports Hx Musculoskeletal

Trauma


Traumatic Medical History: Reports: Hx Fractures


Past Surgical History: Reports: Hx Bowel Surgery - as infant for bleeding in the

bowels, Hx Cholecystectomy, Hx Orthopedic Surgery - cervical fusion x 2, 

bilateral knees torn meniscus.  Denies: Hx Pacemaker





- Immunizations


Hx Diphtheria, Pertussis, Tetanus Vaccination: Yes - 11/16





Physical Exam





- Vital signs


Vitals: 





                                        











Temp Pulse Resp BP Pulse Ox


 


 98.8 F   97   20   176/101 H  97 


 


 09/16/20 20:03  09/16/20 20:03  09/16/20 20:03  09/16/20 20:03  09/16/20 20:03














Course





- Vital Signs


Vital signs: 





                                        











Temp Pulse Resp BP Pulse Ox


 


 98.8 F   97   20   176/101 H  97 


 


 09/16/20 20:03  09/16/20 20:03  09/16/20 20:03  09/16/20 20:03  09/16/20 20:03














Doctor's Discharge





- Discharge


Referrals: 


EVANS BANKS MD [Primary Care Provider] - Follow up as needed

## 2020-09-16 NOTE — RADIOLOGY REPORT (SQ)
EXAM DESCRIPTION: 



XR CHEST 2 VIEWS



COMPLETED DATE/TME:  09/16/2020 20:28



CLINICAL HISTORY:  59 years  Female  Palpitations



COMPARISON:  None.



FINDINGS: 



The cardiomediastinal silhouette appears unremarkable.

No consolidating infiltrates or pleural effusions.

No pneumothorax. 

Degenerative changes in the spine.





IMPRESSION: 



No acute abnormality is identified.

## 2020-09-17 VITALS — SYSTOLIC BLOOD PRESSURE: 145 MMHG | DIASTOLIC BLOOD PRESSURE: 77 MMHG

## 2020-09-17 NOTE — EKG REPORT
SEVERITY:- BORDERLINE ECG -

SINUS RHYTHM

PROBABLE LEFT ATRIAL ABNORMALITY

:

Confirmed by: Yolis Dee 17-Sep-2020 09:17:45

## 2020-09-17 NOTE — ER DOCUMENT REPORT
ED Cardiac





- General


Chief Complaint: Palpitations


Stated Complaint: IRREGULAR HEART BEAT AND NAUSEA


Time Seen by Provider: 09/16/20 20:25


Primary Care Provider: 


JOAQUINA KING MD [ACTIVE PROVISIONAL STAFF] - Follow up in 3-5 days


CELIA LARES MD [Primary Care Provider] - 09/18/20


Mode of Arrival: Ambulatory


Notes: 





Patient is a 59-year-old female presents emergency department with a chief 

complaint of palpitations.  Patient states that he has had on and off 

palpitations for the past few weeks.  Patient has had medication changes since 

last year February.  Patient states that she was started on 12.5 mg of 

hydrochlorothiazide and 5 mg of amlodipine daily.  Patient states that she 

noticed some extra swelling to her lower legs.  States that she has never had 

that before.  Patient also reports that her blood pressure has been high.  She 

also reports nausea.


TRAVEL OUTSIDE OF THE [place] IN LAST 30 DAYS: No





- Related Data


Allergies/Adverse Reactions: 


                                        





Sulfa (Sulfonamide Antibiotics) Allergy (Verified 09/16/20 20:26)


   


diazide Adverse Reaction (Intermediate, Uncoded 09/16/19 19:49)


   











Past Medical History





- General


Information source: Patient





- Social History


Smoking Status: Never Smoker


Frequency of alcohol use: None


Drug Abuse: None


Family History: Arthritis, Hypertension, Malignancy





- Past Medical History


Cardiac Medical History: Reports: Hx Hypertension


   Denies: Hx Atrial Fibrillation, Hx Congestive Heart Failure, Hx Coronary 

Artery Disease, Hx Heart Attack, Hx Hypercholesterolemia, Hx Peripheral Vascular

Disease, Hx Heart Murmur


Pulmonary Medical History: Reports: Hx Pneumonia


   Denies: Hx Tuberculosis


GI Medical History: Reports: Hx Gastroesophageal Reflux Disease


Musculoskeletal Medical History: Reports Hx Arthritis, Reports Hx 

Musculoskeletal Trauma


Traumatic Medical History: Reports: Hx Fractures


Past Surgical History: Reports: Hx Bowel Surgery - as infant for bleeding in the

bowels, Hx Cholecystectomy, Hx Orthopedic Surgery - cervical fusion x 2, 

bilateral knees torn meniscus.  Denies: Hx Pacemaker





- Immunizations


Hx Diphtheria, Pertussis, Tetanus Vaccination: Yes - 11/16





Review of Systems





- Review of Systems


Notes: 


REVIEW OF SYSTEMS:





CONSTITUTIONAL :    Denies recent illness.  Denies recent unintentional weight 

loss.  Denies fever,  chills, or sweats. 


EENT: Denies eye, ear, throat, or mouth pain, discharge, or symptoms.  Denies 

nasal or sinus congestion.


CARDIOVASCULAR:  See HPI.


RESPIRATORY: Denies shortness of breath, cough, congestion, difficulty 

breathing, or wheezing. 


GASTROINTESTINAL: See HPI.


GENITOURINARY:  Denies difficulty urinating, burning, blood in urine, urgency or

frequency.


MUSCULOSKELETAL:  Denies neck and back pain.  Denies joint pain or swelling.


SKIN:   Denies rash, itchiness, or lesions


HEMATOLOGIC :   Denies easy bruising or bleeding.


LYMPHATIC:  Denies swollen, painful, enlarged glands.


NEUROLOGICAL: Denies no numbness or tingling denies weakness.  Denies headache. 

Denies altered mental status.  Denies alteration in speech.


PSYCHIATRIC:  Denies stress, anxiety, alteration in sleep patterns, or 

depression.





All other systems reviewed and negative.





Physical Exam





- Vital signs


Vitals: 


                                        











Temp Pulse Resp BP Pulse Ox


 


 98.8 F   97   20   176/101 H  97 


 


 09/16/20 20:03  09/16/20 20:03  09/16/20 20:03  09/16/20 20:03  09/16/20 20:03














- Notes


Notes: 


 PHYSICAL EXAMINATION:





GENERAL: Appears well, healthy, well-nourished, no acute distress. 





HEAD:  Normocephalic, atraumatic.





EYES:  PERRL, conjunctiva normal, all extraocular movements intact, sclera 

nonicteric





ENT:  Moist mucous membranes. 





NECK: Supple, no noticeable swelling, redness, rash.  Normal range of motion.





LUNGS: Equal breath sounds bilaterally and clear to auscultation.  No wheezes 

rales or rhonchi.





CARDIOVASCULAR: S1-S2, regular rate, regular rhythm.  Radial pulses 2+, normal. 

Grade II systolic murmur noted.





ABDOMEN: Normoactive bowel sounds.  Soft, nontender,  no guarding, no rebound 

tenderness, and no masses palpated.





EXTREMITIES: Normal strength and range of motion, no pitting or edema.  No 

cyanosis. 





NEUROLOGICAL: Moves all extremities upon command.  Strength 5/5 in all 

extremities. 





PSYCH: Normal mood, normal affect.





SKIN: Warm, dry.  No rash, lesions, ulcerations noted.  Normal skin turgor.





Course





- Re-evaluation


Re-evalutation: 





09/16/20


Hematology is unremarkable.  No leukocytosis or anemia noted.  Coags are also 

unremarkable.  Patient's sodium, potassium, and chloride are a little low.  This

most likely due to the patient being on hydrochlorothiazide.  TSH is 

unremarkable.  Troponin is also negative.  Since the patient states that she 

feels that her ankles are more swollen than normal, we will increase her 

hydrochlorothiazide to 25 mg p.o. twice daily.  I will also put her on 10 MEq's 

of potassium daily.  We will give the patient a dose of Phenergan here in the 

emergency department.  We will send her with a prescription for Reglan.  

Informed the patient that if she likes the Phenergan better, to follow-up with 

her primary care provider.  Advised patient to follow-up with her primary care 

provider this week.  Patient agreement with this plan.  X-ray is unremarkable.  

No pneumothorax or pneumonia noted.  Low suspicion for any lifethreatening 

etiology at this time.  Follow-up precautions were given.  Verbal discharge 

instructions were given to the patient.  They verbalized understanding.  They 

are stable for discharge.





- Vital Signs


Vital signs: 


                                        











Temp Pulse Resp BP Pulse Ox


 


 97.9 F   83   16   145/77 H  100 


 


 09/17/20 03:02  09/17/20 03:02  09/17/20 03:02  09/17/20 03:02  09/17/20 03:02














- Laboratory


Result Diagrams: 


                                 09/16/20 21:13





                                 09/16/20 21:13


Laboratory results interpreted by me: 


                                        











  09/16/20





  21:13


 


Sodium  136.7 L


 


Potassium  3.4 L


 


Chloride  97 L


 


Carbon Dioxide  32 H


 


Glucose  113 H














- EKG Interpretation by Me


Additional EKG results interpreted by me: 








Rate 80.  ; ; ; QTc 476.  No ST elevations or depressions 

noted.  








Discharge





- Discharge


Clinical Impression: 


 Palpitations, Nausea, Hypokalemia





Condition: Stable


Disposition: HOME, SELF-CARE


Additional Instructions: 


You were seen today in the emergency department for palpitations and nausea.  

Your lab work and work-up are very reassuring.  Please follow-up with your 

cardiologist.  See if you can get a Holter monitor to monitor when you have 

palpitations.  You are also being sent home with a prescription for Reglan for 

your nausea.  Take as needed for nausea.  You were given a dose of Phenergan 

here in the emergency department.  If this made you feel better than the Reglan,

ask for a prescription for Phenergan from your primary care provider.  As far as

your blood pressure goes, your hydrochlorothiazide is going to be increased.  

You are also being started on potassium.  Follow-up with your primary care 

provider in the next 2 to 3 days.


Prescriptions: 


Hydrochlorothiazide [Hydrodiuril 25 mg Tablet] 25 mg PO QAM #30 tablet


Potassium Chloride [Klor-Con 10 Meq Tablet ER] 10 meq PO DAILY #30 tablet.sa


Metoclopramide HCl [Reglan 10 mg Tablet] 1 - 2 tab PO ASDIR PRN #25 tablet


 PRN Reason: 


Referrals: 


CELIA LARES MD [Primary Care Provider] - 09/18/20


JOAQUINA KING MD [ACTIVE PROVISIONAL STAFF] - Follow up in 3-5 days

## 2021-01-05 ENCOUNTER — HOSPITAL ENCOUNTER (OUTPATIENT)
Dept: HOSPITAL 62 - WI | Age: 61
End: 2021-01-05
Attending: OBSTETRICS & GYNECOLOGY
Payer: COMMERCIAL

## 2021-01-05 DIAGNOSIS — Z12.31: Primary | ICD-10-CM

## 2021-01-05 PROCEDURE — 77063 BREAST TOMOSYNTHESIS BI: CPT

## 2021-01-05 PROCEDURE — 77067 SCR MAMMO BI INCL CAD: CPT

## 2021-01-05 NOTE — WOMENS IMAGING REPORT
EXAM DESCRIPTION:  3D SCREENING MAMMO BILAT



IMAGES COMPLETED DATE/TIME:  1/5/2021 10:03 am



REASON FOR STUDY:  ROUTINE SCREENING MAMMOGRAM Z12.31 Z12.31  ENCNTR SCREEN MAMMOGRAM FOR MALIGNANT N
EOPLASM OF [place]



COMPARISON:  12/16/2019 and 12/14/2018.



EXAM PARAMETERS:  Views: Standard craniocaudal and mediolateral oblique views of each breast recorded
 using digital acquisition and breast tomosynthesis.

Read with the assistance of CAD.

.[facility] [employer]  Version 9.2



LIMITATIONS:  None.



FINDINGS:  No suspicious masses, suspicious calcifications or architectural distortion. No areas of c
oncern.



IMPRESSION:   NEGATIVE MAMMOGRAM. BIRADS 1.



BREAST DENSITY:  b. There are scattered areas of fibroglandular density.



BIRAD:  ASSESSMENT:  1 NEGATIVE



RECOMMENDATION:  ROUTINE SCREENING



COMMENT:  The patient has been notified of the results by letter per MQSA requirements. Additional no
tification policies are in place for contacting patient with suspicious or incomplete findings.

Quality ID #225: The [employer] College [employer] recommends an annual screening mammogram for women
 aged 40 years or over. This facility utilizes a reminder system to ensure that all patients receive 
reminder letters, and/or direct phone calls for appointments. This includes reminders for routine scr
eening mammograms, diagnostic mammograms, or other Breast Imaging Interventions when appropriate.  Th
is patient will be placed in the appropriate reminder system.



TECHNICAL DOCUMENTATION:  FINDING NUMBER: (1)

ASSESSMENT:  (1)

JOB ID:  [id number]

 2011 [employer]- All Rights Reserved



Reading location - IP/workstation name: [email or web address]